# Patient Record
Sex: FEMALE | Race: WHITE | NOT HISPANIC OR LATINO | Employment: UNEMPLOYED | URBAN - METROPOLITAN AREA
[De-identification: names, ages, dates, MRNs, and addresses within clinical notes are randomized per-mention and may not be internally consistent; named-entity substitution may affect disease eponyms.]

---

## 2023-05-01 ENCOUNTER — OFFICE VISIT (OUTPATIENT)
Dept: PRIMARY CARE | Facility: CLINIC | Age: 43
End: 2023-05-01

## 2023-05-01 VITALS — SYSTOLIC BLOOD PRESSURE: 119 MMHG | DIASTOLIC BLOOD PRESSURE: 82 MMHG

## 2023-05-01 DIAGNOSIS — E78.5 HYPERLIPIDEMIA, UNSPECIFIED HYPERLIPIDEMIA TYPE: ICD-10-CM

## 2023-05-01 DIAGNOSIS — Z12.11 SCREENING FOR COLON CANCER: ICD-10-CM

## 2023-05-01 DIAGNOSIS — I10 HYPERTENSION, UNSPECIFIED TYPE: ICD-10-CM

## 2023-05-01 DIAGNOSIS — A15.9 TB (TUBERCULOSIS): ICD-10-CM

## 2023-05-01 DIAGNOSIS — N60.02 BENIGN CYST OF LEFT BREAST IN FEMALE: Primary | ICD-10-CM

## 2023-05-01 DIAGNOSIS — J03.90 TONSILLITIS: ICD-10-CM

## 2023-05-01 PROCEDURE — 80053 COMPREHEN METABOLIC PANEL: CPT | Performed by: INTERNAL MEDICINE

## 2023-05-01 PROCEDURE — 1036F TOBACCO NON-USER: CPT | Performed by: INTERNAL MEDICINE

## 2023-05-01 PROCEDURE — 3079F DIAST BP 80-89 MM HG: CPT | Performed by: INTERNAL MEDICINE

## 2023-05-01 PROCEDURE — 36415 COLL VENOUS BLD VENIPUNCTURE: CPT | Performed by: INTERNAL MEDICINE

## 2023-05-01 PROCEDURE — 86481 TB AG RESPONSE T-CELL SUSP: CPT

## 2023-05-01 PROCEDURE — 85025 COMPLETE CBC W/AUTO DIFF WBC: CPT | Performed by: INTERNAL MEDICINE

## 2023-05-01 PROCEDURE — 3074F SYST BP LT 130 MM HG: CPT | Performed by: INTERNAL MEDICINE

## 2023-05-01 PROCEDURE — 99214 OFFICE O/P EST MOD 30 MIN: CPT | Performed by: INTERNAL MEDICINE

## 2023-05-01 PROCEDURE — 80061 LIPID PANEL: CPT | Performed by: INTERNAL MEDICINE

## 2023-05-01 RX ORDER — DOXYCYCLINE 100 MG/1
100 TABLET ORAL EVERY 12 HOURS
Qty: 20 TABLET | Refills: 0 | Status: SHIPPED | OUTPATIENT
Start: 2023-05-01 | End: 2024-02-26 | Stop reason: ALTCHOICE

## 2023-05-01 RX ORDER — DOXYCYCLINE 100 MG/1
100 TABLET ORAL EVERY 12 HOURS
COMMUNITY
Start: 2022-05-22 | End: 2023-05-01 | Stop reason: SDUPTHER

## 2023-05-01 NOTE — PROGRESS NOTES
Subjective   Patient ID: Maureen Thapa is a 43 y.o. female who presents for PPD Read.    HPI   43 years old comes to see me because she needed a TB test on the blood.  She cannot do skin test because she had BCG in the past and it always shows positive.  Also complaining of exposure to viruses from her children 14 and 11 and 8 years old.  Always having a sore throat and concerned about tonsillitis.  Never heard of mononucleosis.  We agreed to give her not to biotic other than penicillin.  Gargle and use a lot of Chloraseptic, vitamin C vitamin D and zinc oxide.  She thinks she is doing in any event.  Review of Systems  12 system reviewed and all negative.  Objective   /82 (BP Location: Right arm, Patient Position: Sitting)     Physical Exam  Alert oriented in no acute distress.  Nonicteric sclera no jaundice.  Face symmetrical cranial nerves intact.  Throat clean small tonsils no spots no infection or exudate present.  No lymph nodes palpable at the throat or neck area.  No cervical lymph nodes.  Neck supple no masses no lymph no thyromegaly or jugular venous distention.  Lungs clear no rales no wheezing or crackles.  Heart normal S1 and S2 regular rhythm.  Abdomen benign nontender no masses no organomegaly.  No pain on palpations.  Neurological exam negative.  She is already scheduled for repeat left breast ultrasound in August this year.  She would like to have a colonoscopy instead of Cologuard.  Testing for TB on the blood was done including the other blood test which we will call her with results as soon as available.  Agreed to use doxycycline twice a day for 7 to 10 days.  Assessment/Plan   Diagnoses and all orders for this visit:  Benign cyst of left breast in female  Screening for colon cancer  -     Colonoscopy; Future  Hypertension, unspecified type  -     Comprehensive Metabolic Panel  -     CBC  Hyperlipidemia, unspecified hyperlipidemia type  -     Lipid Panel  TB (tuberculosis)  -     T-Spot  TB

## 2023-05-04 LAB
NIL(NEG) CONTROL SPOT COUNT: NORMAL
PANEL A SPOT COUNT: 0
PANEL B SPOT COUNT: 0
POS CONTROL SPOT COUNT: NORMAL
T-SPOT. TB INTERPRETATION: NEGATIVE

## 2023-05-05 ENCOUNTER — TELEPHONE (OUTPATIENT)
Dept: PRIMARY CARE | Facility: CLINIC | Age: 43
End: 2023-05-05

## 2023-05-05 NOTE — TELEPHONE ENCOUNTER
Planning to call patient with lab results.  She walked in the office and we were able to discuss her lab results on face-to-face.  TB test negative.  Rest of her labs within normal limit.

## 2023-09-26 ENCOUNTER — TELEPHONE (OUTPATIENT)
Dept: PRIMARY CARE | Facility: CLINIC | Age: 43
End: 2023-09-26

## 2023-10-30 ENCOUNTER — TELEMEDICINE (OUTPATIENT)
Dept: PRIMARY CARE | Facility: CLINIC | Age: 43
End: 2023-10-30

## 2023-10-30 DIAGNOSIS — J20.9 ACUTE BRONCHITIS, UNSPECIFIED ORGANISM: Primary | ICD-10-CM

## 2023-10-30 PROCEDURE — 99441 PR PHYS/QHP TELEPHONE EVALUATION 5-10 MIN: CPT | Performed by: INTERNAL MEDICINE

## 2023-10-30 RX ORDER — AZITHROMYCIN 250 MG/1
TABLET, FILM COATED ORAL
Qty: 6 TABLET | Refills: 0 | Status: SHIPPED | OUTPATIENT
Start: 2023-10-30 | End: 2023-11-04

## 2023-10-30 RX ORDER — BENZONATATE 200 MG/1
200 CAPSULE ORAL 3 TIMES DAILY PRN
Qty: 30 CAPSULE | Refills: 0 | Status: SHIPPED | OUTPATIENT
Start: 2023-10-30 | End: 2023-11-29

## 2023-10-30 NOTE — PROGRESS NOTES
Subjective   Patient ID: Maureen Thapa is a 43 y.o. female who presents for No chief complaint on file..    HPI   I had a telephone visit with Carmella obrien at around 6:00 on October 30, 2023.  She is complaining of severe cough for the last 4 weeks.  She was checked multiple times for COVID and was mostly negative.  She has no fever.  Severe sore throat and now it came down to her lungs where she is coughing up phlegm yellowish to greenish color.  No nasal discharge.  She is allergic to penicillin when she takes it in high doses.  We agreed to treat her for acute bronchitis with azithromycin and benzonatate for cough.  I will put  Review of Systems  12 system reviewed mostly negative except for severe cough and productive cough with a sore throat.  Objective   There were no vitals taken for this visit.  This visit was completed via telephone due to the restriction of the COVID 19 pandemic.  All issues were addressed and discussed but no physical exam was done or performed.  If it was felt that this patient should be evaluated in clinic then they were directed there.  The patient verbally consented to this visit.  I spent 5 minutes on the phone discussing health concerns with this patient  Physical Exam  No exam done.  Assessment/Plan   Diagnoses and all orders for this visit:  Acute bronchitis, unspecified organism

## 2024-02-15 ENCOUNTER — APPOINTMENT (OUTPATIENT)
Dept: RADIOLOGY | Facility: CLINIC | Age: 44
End: 2024-02-15
Payer: COMMERCIAL

## 2024-02-15 ENCOUNTER — HOSPITAL ENCOUNTER (OUTPATIENT)
Dept: RADIOLOGY | Facility: CLINIC | Age: 44
Discharge: HOME | End: 2024-02-15
Payer: COMMERCIAL

## 2024-02-15 VITALS — HEIGHT: 67 IN | WEIGHT: 155 LBS | BODY MASS INDEX: 24.33 KG/M2

## 2024-02-15 DIAGNOSIS — R92.8 OTHER ABNORMAL AND INCONCLUSIVE FINDINGS ON DIAGNOSTIC IMAGING OF BREAST: ICD-10-CM

## 2024-02-15 DIAGNOSIS — Z12.31 SCREENING MAMMOGRAM FOR BREAST CANCER: ICD-10-CM

## 2024-02-15 PROCEDURE — 77066 DX MAMMO INCL CAD BI: CPT | Performed by: RADIOLOGY

## 2024-02-15 PROCEDURE — 77062 BREAST TOMOSYNTHESIS BI: CPT | Performed by: RADIOLOGY

## 2024-02-15 PROCEDURE — 77066 DX MAMMO INCL CAD BI: CPT

## 2024-02-24 NOTE — PROGRESS NOTES
Subjective   Patient ID: Maureen Thapa is a 43 y.o. female who presents for Annual Exam.    PAP 4-19-21 ASCUS, HPV NEG  MAMMO 2-15-24  DEXA NEVER  COLON 5-1-23 good for 10 years.      Mother passed of Covid, was diagnosed with a small bowel condition that is genetic, spine bends and puts weight on bowel. If she keeps a good diet and doesn't gain weight it shouldn't be an issue.     Paragard inserted 3/15. Cycles becoming more irregular 27 days. Had a pregnancy a few years that she passed with IUD.    Not working. Kids in school. 14, 12, 9. Taking MVI with D and EPA. Daughter diagnosed with autoimmune disease, genetic. It is CAH.     H/O overactive thyroid, ok couple years ago. Was seeing Dermatologist for acne. Male hormones elevated. Stopped spironolactone.      Recommend vit D. in MVI. Valtrex for cold sore.     No longer stomach issues.         Review of Systems   All other systems reviewed and are negative.      Objective   Constitutional: Alert and in no acute distress. Well developed, well nourished.  Neck: no neck asymmetry. Supple and thyroid not enlarged and there were no palpable thyroid nodules.  Chest: Breasts normal appearance, no nipple discharge and no skin changes and palpation of breasts and axillae: no palpable mass and no axillary lymphadenopathy.  Abdomen: soft nontender; no abdominal mass palpated, no organomegaly and no hernias.  Genitourinary: external genitalia: normal, no inguinal lymphadenopathy, Bartholin's urethral and [place]'s glands: normal, urethra: normal and bladder: normal on palpation.  Vagina: normal.  Cervix: normal. Strings seen.  Uterus: normal.   Right adnexa/parametria: normal.  Left adnexa/parametria: normal.  Skin: normal skin color and pigmentation, normal skin turgor and no rash.  Psychiatric: alert and oriented x 3, affect normal to patient baseline and mood appropriate.     Assessment/Plan   -Patient will call January 2025 to get approval for new Paragard and schedule  annual and insertion.  -pap-HPV  -Fariha ordered for next year.

## 2024-02-26 ENCOUNTER — OFFICE VISIT (OUTPATIENT)
Dept: OBSTETRICS AND GYNECOLOGY | Facility: CLINIC | Age: 44
End: 2024-02-26
Payer: COMMERCIAL

## 2024-02-26 VITALS
DIASTOLIC BLOOD PRESSURE: 62 MMHG | HEIGHT: 67 IN | BODY MASS INDEX: 25.11 KG/M2 | WEIGHT: 160 LBS | SYSTOLIC BLOOD PRESSURE: 108 MMHG

## 2024-02-26 DIAGNOSIS — Z01.419 WELL WOMAN EXAM WITH ROUTINE GYNECOLOGICAL EXAM: ICD-10-CM

## 2024-02-26 DIAGNOSIS — Z12.31 ENCOUNTER FOR SCREENING MAMMOGRAM FOR BREAST CANCER: Primary | ICD-10-CM

## 2024-02-26 DIAGNOSIS — Z12.4 CERVICAL CANCER SCREENING: ICD-10-CM

## 2024-02-26 PROCEDURE — 1036F TOBACCO NON-USER: CPT | Performed by: OBSTETRICS & GYNECOLOGY

## 2024-02-26 PROCEDURE — 88175 CYTOPATH C/V AUTO FLUID REDO: CPT

## 2024-02-26 PROCEDURE — 87624 HPV HI-RISK TYP POOLED RSLT: CPT

## 2024-02-26 PROCEDURE — 99396 PREV VISIT EST AGE 40-64: CPT | Performed by: OBSTETRICS & GYNECOLOGY

## 2024-02-26 RX ORDER — COPPER 313.4 MG/1
1 INTRAUTERINE DEVICE INTRAUTERINE ONCE
COMMUNITY

## 2024-02-26 RX ORDER — MULTIVITAMIN
TABLET ORAL
COMMUNITY

## 2024-02-26 RX ORDER — VALACYCLOVIR HYDROCHLORIDE 500 MG/1
500 TABLET, FILM COATED ORAL
COMMUNITY

## 2024-07-12 ENCOUNTER — TELEPHONE (OUTPATIENT)
Dept: PRIMARY CARE | Facility: CLINIC | Age: 44
End: 2024-07-12

## 2024-07-12 ENCOUNTER — OFFICE VISIT (OUTPATIENT)
Dept: PRIMARY CARE | Facility: CLINIC | Age: 44
End: 2024-07-12
Payer: COMMERCIAL

## 2024-07-12 VITALS
WEIGHT: 153 LBS | TEMPERATURE: 98.4 F | BODY MASS INDEX: 23.96 KG/M2 | OXYGEN SATURATION: 97 % | HEART RATE: 93 BPM | SYSTOLIC BLOOD PRESSURE: 107 MMHG | DIASTOLIC BLOOD PRESSURE: 73 MMHG

## 2024-07-12 DIAGNOSIS — Z86.69 H/O TINNITUS: ICD-10-CM

## 2024-07-12 DIAGNOSIS — D64.9 ANEMIA, UNSPECIFIED TYPE: Primary | ICD-10-CM

## 2024-07-12 DIAGNOSIS — R53.83 FATIGUE, UNSPECIFIED TYPE: ICD-10-CM

## 2024-07-12 DIAGNOSIS — R31.21 ASYMPTOMATIC MICROSCOPIC HEMATURIA: ICD-10-CM

## 2024-07-12 DIAGNOSIS — J01.01 ACUTE RECURRENT MAXILLARY SINUSITIS: ICD-10-CM

## 2024-07-12 DIAGNOSIS — R05.1 ACUTE COUGH: ICD-10-CM

## 2024-07-12 DIAGNOSIS — I73.9 PVD (PERIPHERAL VASCULAR DISEASE) (CMS-HCC): ICD-10-CM

## 2024-07-12 DIAGNOSIS — I10 HYPERTENSION, UNSPECIFIED TYPE: ICD-10-CM

## 2024-07-12 DIAGNOSIS — E78.2 MIXED HYPERLIPIDEMIA: ICD-10-CM

## 2024-07-12 LAB
APPEARANCE UR: CLEAR
BILIRUB UR QL STRIP: NEGATIVE
COLOR UR: YELLOW
GLUCOSE UR STRIP-MCNC: NEGATIVE MG/DL
HGB UR QL STRIP: NEGATIVE
KETONES UR STRIP-MCNC: NEGATIVE MG/DL
LEUKOCYTE ESTERASE UR QL STRIP: NEGATIVE
NITRITE UR QL STRIP: NEGATIVE
PH UR STRIP: 6 [PH]
PROT UR STRIP-MCNC: NEGATIVE MG/DL
SP GR UR STRIP.AUTO: 1.01
UROBILINOGEN UR STRIP-ACNC: 0.2 E.U./DL

## 2024-07-12 RX ORDER — BENZONATATE 200 MG/1
200 CAPSULE ORAL 3 TIMES DAILY PRN
Qty: 30 CAPSULE | Refills: 0 | Status: SHIPPED | OUTPATIENT
Start: 2024-07-12 | End: 2024-08-11

## 2024-07-12 RX ORDER — AMOXICILLIN AND CLAVULANATE POTASSIUM 500; 125 MG/1; MG/1
500 TABLET, FILM COATED ORAL 2 TIMES DAILY
Qty: 20 TABLET | Refills: 0 | Status: SHIPPED | OUTPATIENT
Start: 2024-07-12 | End: 2024-07-22

## 2024-07-12 ASSESSMENT — PATIENT HEALTH QUESTIONNAIRE - PHQ9
1. LITTLE INTEREST OR PLEASURE IN DOING THINGS: NOT AT ALL
SUM OF ALL RESPONSES TO PHQ9 QUESTIONS 1 AND 2: 0
2. FEELING DOWN, DEPRESSED OR HOPELESS: NOT AT ALL

## 2024-07-12 ASSESSMENT — ENCOUNTER SYMPTOMS
OCCASIONAL FEELINGS OF UNSTEADINESS: 0
LOSS OF SENSATION IN FEET: 0
DEPRESSION: 0

## 2024-07-12 NOTE — PROGRESS NOTES
Subjective   Patient ID: Maureen Thapa is a 44 y.o. female who presents for Earache (Right) and Sinusitis.    HPI   44 years old female comes in to see me today because she was exposed to her son 9 years old who had a virus infection.  A week ago she was having a sore throat with a cough productive with right ear pain feels like muffled headaches and the sinus pain.  Review of Systems  12 system review 12 system negative.  Objective   /73 (BP Location: Right arm, Patient Position: Sitting, BP Cuff Size: Adult)   Pulse 93   Temp 36.9 °C (98.4 °F) (Temporal)   Wt 69.4 kg (153 lb)   SpO2 97%   BMI 23.96 kg/m²     Physical Exam  At oriented in no distress.  Ears are clean on both sides no wax and no signs of infection or inflammation.  Nose reveals right side sinus inflammation with the drainage positive.  Throat clear no exudate or inflammation but slight redness.  Neck supple no masses no lymph node no thyromegaly no jugular venous distention.  Lungs clear no rales wheezing or crackles.  Heart normal S1 and S2 regular rhythm.  Abdomen benign nontender no masses no organomegaly.  Neurologically intact.  Lower extremities pain.  We agreed to treat her sinus infection with Augmentin even though she is allergic to penicillin she states that she takes Augmentin and amoxicillin as long as she does not take it more than 7 to 10 days she is fine I insisted to change her medication but she claims that the amoxicillin works best.  And she would really like to have it.  Adding benzonatate for her cough.  Referral to see vascular medicine surgeon Dr. Andie Pinzon.  Assessment/Plan   Diagnoses and all orders for this visit:  Anemia, unspecified type  -     CBC  Hypertension, unspecified type  -     Comprehensive Metabolic Panel  Mixed hyperlipidemia  -     Lipid Panel  Fatigue, unspecified type  -     Thyroid Stimulating Hormone  Asymptomatic microscopic hematuria  -     POCT UA (Automated) docked device  H/O  tinnitus  PVD (peripheral vascular disease) (CMS-[facility])  -     Referral to Vascular Medicine; Future  Acute recurrent maxillary sinusitis  -     amoxicillin-pot clavulanate (Augmentin) 500-125 mg tablet; Take 1 tablet (500 mg) by mouth 2 times a day for 10 days.  Acute cough  -     benzonatate (Tessalon) 200 mg capsule; Take 1 capsule (200 mg) by mouth 3 times a day as needed for cough. Do not crush or chew.  Other orders  -     POCT URINALYSIS AUTOMATED

## 2024-07-16 ENCOUNTER — TELEPHONE (OUTPATIENT)
Dept: PRIMARY CARE | Facility: CLINIC | Age: 44
End: 2024-07-16
Payer: COMMERCIAL

## 2024-07-30 ENCOUNTER — OFFICE VISIT (OUTPATIENT)
Dept: PRIMARY CARE | Facility: CLINIC | Age: 44
End: 2024-07-30
Payer: COMMERCIAL

## 2024-07-30 ENCOUNTER — APPOINTMENT (OUTPATIENT)
Dept: PRIMARY CARE | Facility: CLINIC | Age: 44
End: 2024-07-30
Payer: COMMERCIAL

## 2024-07-30 VITALS
OXYGEN SATURATION: 98 % | HEART RATE: 80 BPM | BODY MASS INDEX: 24.12 KG/M2 | SYSTOLIC BLOOD PRESSURE: 115 MMHG | DIASTOLIC BLOOD PRESSURE: 78 MMHG | TEMPERATURE: 98.2 F | WEIGHT: 154 LBS

## 2024-07-30 DIAGNOSIS — J32.3 SINUSITIS CHRONIC, SPHENOIDAL: ICD-10-CM

## 2024-07-30 DIAGNOSIS — R42 VERTIGO: ICD-10-CM

## 2024-07-30 DIAGNOSIS — N30.00 ACUTE CYSTITIS WITHOUT HEMATURIA: Primary | ICD-10-CM

## 2024-07-30 LAB
APPEARANCE UR: CLEAR
BILIRUB UR QL STRIP: NEGATIVE
COLOR UR: YELLOW
GLUCOSE UR STRIP-MCNC: NEGATIVE MG/DL
HGB UR QL STRIP: NEGATIVE
KETONES UR STRIP-MCNC: NEGATIVE MG/DL
LEUKOCYTE ESTERASE UR QL STRIP: ABNORMAL
NITRITE UR QL STRIP: POSITIVE
PH UR STRIP: 7 [PH]
PROT UR STRIP-MCNC: NEGATIVE MG/DL
SP GR UR STRIP.AUTO: 1.02
UROBILINOGEN UR STRIP-ACNC: 0.2 E.U./DL

## 2024-07-30 PROCEDURE — 87086 URINE CULTURE/COLONY COUNT: CPT

## 2024-07-30 PROCEDURE — 81003 URINALYSIS AUTO W/O SCOPE: CPT | Performed by: INTERNAL MEDICINE

## 2024-07-30 PROCEDURE — 1036F TOBACCO NON-USER: CPT | Performed by: INTERNAL MEDICINE

## 2024-07-30 PROCEDURE — 99214 OFFICE O/P EST MOD 30 MIN: CPT | Performed by: INTERNAL MEDICINE

## 2024-07-30 PROCEDURE — 87186 SC STD MICRODIL/AGAR DIL: CPT

## 2024-07-30 RX ORDER — MECLIZINE HYDROCHLORIDE 25 MG/1
25 TABLET ORAL 3 TIMES DAILY PRN
Qty: 30 TABLET | Refills: 2 | Status: SHIPPED | OUTPATIENT
Start: 2024-07-30 | End: 2025-07-30

## 2024-07-30 RX ORDER — SULFAMETHOXAZOLE AND TRIMETHOPRIM 800; 160 MG/1; MG/1
1 TABLET ORAL 2 TIMES DAILY
Qty: 20 TABLET | Refills: 0 | Status: SHIPPED | OUTPATIENT
Start: 2024-07-30 | End: 2024-08-02

## 2024-07-30 ASSESSMENT — PATIENT HEALTH QUESTIONNAIRE - PHQ9
2. FEELING DOWN, DEPRESSED OR HOPELESS: NOT AT ALL
SUM OF ALL RESPONSES TO PHQ9 QUESTIONS 1 AND 2: 0
1. LITTLE INTEREST OR PLEASURE IN DOING THINGS: NOT AT ALL

## 2024-07-30 ASSESSMENT — ENCOUNTER SYMPTOMS
DEPRESSION: 0
OCCASIONAL FEELINGS OF UNSTEADINESS: 0
LOSS OF SENSATION IN FEET: 0

## 2024-07-30 NOTE — PROGRESS NOTES
Subjective   Patient ID: Maureen Thapa is a 44 y.o. female who presents for Vertigo (Right eat popping, vomitting).    HPI   44 years old female comes in to see me today complaining of vertigo when she moves her head she feels like the whole room is spinning around her.  She had a fall tonight when she stood up to go to the bathroom.  Felt very dizzy.  She is concerned about driving the kids around.  She had a also UTI on top of all with frequent urination but no hematuria dysuria is present.  She is allergic to penicillin.  She had sinus infection not too long ago that affected her right ear.  Her right ear feels like a popping.  She is asking for an ENT consult which I agreed to.  Review of Systems  12 system reviewed 12 system negative except for right ear popping, UTI and vertebral.  Objective   /78 (BP Location: Right arm, Patient Position: Sitting, BP Cuff Size: Adult)   Pulse 80   Temp 36.8 °C (98.2 °F) (Temporal)   Wt 69.9 kg (154 lb)   SpO2 98%   BMI 24.12 kg/m²     Physical Exam  Alert oriented no distress active and able to ambulate and walk without any problems.  Moving her head right up and down right left causes vertigo and dizzy spells.  Right ear looks good and normal.  Throat is good no signs of infection.  No drainage from her nose.  Neck supple no masses no lymph node no thyromegaly or jugular venous distention.  Nonicteric sclera.  No jaundice.  Lungs clear.  Heart normal S1 and S2 regular rhythm.  Abdomen benign neurologically intact.  Treated vertigo with meclizine 25 mg 3 times a day.  Treat UTI with Bactrim DS for 5 to 7 days twice a day.  Refer to ENT for sinus and ear pain or block  Urinalysis and urine culture obtained.  Assessment/Plan   Diagnoses and all orders for this visit:  Acute cystitis without hematuria  -     sulfamethoxazole-trimethoprim (Bactrim DS) 800-160 mg tablet; Take 1 tablet by mouth 2 times a day for 10 days.  -     POCT UA (Automated) docked device  -      Urine Culture; Future  Vertigo  -     meclizine (Antivert) 25 mg tablet; Take 1 tablet (25 mg) by mouth 3 times a day as needed for dizziness.  Sinusitis chronic, sphenoidal  -     Referral to ENT; Future

## 2024-08-01 LAB — BACTERIA UR CULT: ABNORMAL

## 2024-08-02 ENCOUNTER — TELEPHONE (OUTPATIENT)
Dept: PRIMARY CARE | Facility: CLINIC | Age: 44
End: 2024-08-02
Payer: COMMERCIAL

## 2024-08-02 DIAGNOSIS — R31.9 URINARY TRACT INFECTION WITH HEMATURIA, SITE UNSPECIFIED: ICD-10-CM

## 2024-08-02 DIAGNOSIS — N30.00 ACUTE CYSTITIS WITHOUT HEMATURIA: Primary | ICD-10-CM

## 2024-08-02 DIAGNOSIS — N39.0 URINARY TRACT INFECTION WITH HEMATURIA, SITE UNSPECIFIED: ICD-10-CM

## 2024-08-02 RX ORDER — NITROFURANTOIN 25; 75 MG/1; MG/1
100 CAPSULE ORAL 2 TIMES DAILY
Qty: 20 CAPSULE | Refills: 0 | Status: SHIPPED | OUTPATIENT
Start: 2024-08-02 | End: 2024-08-12

## 2024-08-02 RX ORDER — NITROFURANTOIN 25; 75 MG/1; MG/1
100 CAPSULE ORAL 2 TIMES DAILY
Qty: 20 CAPSULE | Refills: 1 | Status: SHIPPED | OUTPATIENT
Start: 2024-08-02 | End: 2024-10-01

## 2024-09-05 ENCOUNTER — APPOINTMENT (OUTPATIENT)
Dept: OTOLARYNGOLOGY | Facility: CLINIC | Age: 44
End: 2024-09-05
Payer: COMMERCIAL

## 2024-09-05 VITALS — WEIGHT: 155 LBS | BODY MASS INDEX: 24.28 KG/M2

## 2024-09-05 DIAGNOSIS — J30.9 ALLERGIC RHINITIS, UNSPECIFIED SEASONALITY, UNSPECIFIED TRIGGER: ICD-10-CM

## 2024-09-05 DIAGNOSIS — H93.19 TINNITUS, UNSPECIFIED LATERALITY: ICD-10-CM

## 2024-09-05 DIAGNOSIS — H69.93 DYSFUNCTION OF BOTH EUSTACHIAN TUBES: ICD-10-CM

## 2024-09-05 DIAGNOSIS — J32.9 CHRONIC SINUSITIS, UNSPECIFIED LOCATION: Primary | ICD-10-CM

## 2024-09-05 DIAGNOSIS — J35.01 CHRONIC TONSILLITIS: ICD-10-CM

## 2024-09-05 PROCEDURE — 99204 OFFICE O/P NEW MOD 45 MIN: CPT | Performed by: GENERAL PRACTICE

## 2024-09-05 RX ORDER — METHYLPREDNISOLONE 4 MG/1
TABLET ORAL
Qty: 21 TABLET | Refills: 0 | Status: SHIPPED | OUTPATIENT
Start: 2024-09-05

## 2024-09-05 RX ORDER — CLARITHROMYCIN 500 MG/1
1000 TABLET, FILM COATED, EXTENDED RELEASE ORAL DAILY
Qty: 20 TABLET | Refills: 0 | Status: SHIPPED | OUTPATIENT
Start: 2024-09-05 | End: 2024-09-15

## 2024-09-05 RX ORDER — TRIAMCINOLONE ACETONIDE 55 UG/1
2 SPRAY, METERED NASAL DAILY
Qty: 16.5 G | Refills: 3 | Status: SHIPPED | OUTPATIENT
Start: 2024-09-05 | End: 2025-09-05

## 2024-09-06 NOTE — PROGRESS NOTES
Otolaryngology - Head and Neck Surgery Outpatient New Patient Visit Note        Assessment/Plan:   Problem List Items Addressed This Visit    None  Visit Diagnoses         Codes    Chronic sinusitis, unspecified location    -  Primary J32.9    Relevant Medications    clarithromycin XL (Biaxin XL) 500 mg 24 hr tablet    Other Relevant Orders    CT sinus wo IV contrast    Dysfunction of both eustachian tubes     H69.93    Relevant Medications    methylPREDNISolone (Medrol Dospak) 4 mg tablets    triamcinolone (Nasacort) 55 mcg nasal inhaler    Other Relevant Orders    CT sinus wo IV contrast    Tinnitus, unspecified laterality     H93.19    Relevant Orders    Referral to Audiology    Chronic tonsillitis     J35.01    Allergic rhinitis, unspecified seasonality, unspecified trigger     J30.9            44yoF with a history of chronic sinus pressure/discomfort, recurrent URIs and ETD symptoms.      Will acquire treated sinus CT to aid in eval   Nasacort for baseline rhinitis due to to dry irritation with flonase.        Pt with bothersome tonsil stones and some chronic irritation.  Considering tonsillectomy.  Discussed controls for irritants prior to considering tonsillectomy.      Audiogram for baseline tinnitus and recent ETD symptoms.         Follow up:  -plan for follow up in clinic as needed, after completion of ordered studies, and in 1-2 months    All of the above findings, impressions, treatment planning and follow up plans were discussed with the patient who indicated understanding.  the patient was instructed to contact or return to clinic sooner if symptoms/signs persist or worsen despite the above management.      Chong Quach MD  Otolaryngology - Head and Neck Surgery            History Of Present Illness  Maureen Thapa is a 44 y.o. female presenting for evaluation fo a history of recurrent sinusitis and chronic rhinitis.    Reports chronic congestion, some waxing/waning max/midface pain/pressure and  bothersome PND.     Notes recurrent sinusitis 2-3x yearly, requiring abx or OTC care.     Reports allergic rhinitis, uses allegra PRN. Prior use of flonase over months which caused increase in symptoms and irritation.   Saline sprays intermittently.     Reports R>L sinus pressure/discomfort.     Notes some chronic sore throat associated with PND, and tonsil stones.     Rare GERD history.     Reports waxing/waning R>L ear fullness and pressure in recent months, worse with URIs.      Reports some dizziness/vertigo associated with recent URI which resolved over several days, now asymptomatic.    Rare prior vertigo/dizziness.     Noes some baseline tinnitus b/l.                  Past Medical History  She has a past medical history of Personal history of other complications of pregnancy, childbirth and the puerperium, Personal history of other diseases of the circulatory system, Personal history of other diseases of the circulatory system, Personal history of other diseases of the female genital tract, and Varicella without complication.    Surgical History  She has a past surgical history that includes Varicose vein surgery (06/10/2016) and Mouth surgery (12/18/2014).     Social History  She reports that she quit smoking about 44 years ago. Her smoking use included cigarettes. She has never used smokeless tobacco. She reports that she does not currently use alcohol after a past usage of about 1.0 standard drink of alcohol per week. She reports that she does not use drugs.    Family History  Family History   Problem Relation Name Age of Onset    Blood clot Mother      Diabetes Father      Other (HTN) Father      Hyperlipidemia Father      Cancer Mother's Sister          Allergies  Iodinated contrast media and Penicillin g    Review of Systems  ROS: Pertinent positives as noted in HPI.    - CONSTITUTIONAL: Does not report weight loss, fever or chills.    - HEENT:   Ear: Does not report  , vertigo,    , otalgia,  otorrhea  Nose: Does not report  ,  , epistaxis, decreased smell  Throat: Does not report pain, dysphagia, odynophagia  Larynx: Does not report hoarseness,  difficulty breathing, pain with speaking (odynophonia)  Neck: Does not report new masses, pain, swelling  Face: Does not report    ,  , swelling, numbness, weakness     - RESPIRATORY: Does not report SOB or cough.    - CV: Does not report palpitations or chest pain.     - GI: Does not report abdominal pain, nausea, vomiting or diarrhea.    - : Does not report dysuria or urinary frequency.    - MSK: Does not report myalgia or joint pain.    - SKIN: Does not report rash or pruritus.    - NEUROLOGICAL: Does not report headache or syncope.    - PSYCHIATRIC: Does not report recent changes in mood. Does not report anxiety or depression.         Physical Exam:     GENERAL:   Alert & Oriented to person, place and time; Normal affect and appearance. Well developed and well nourished. Conversant & cooperative with examination.     HEAD:   Normocephalic, atraumatic. No sinus tenderness to palpation. Normal parotid bilaterally. Normal facial strength.     NEUROLOGIC:   Cranial nerves II-XII grossly intact, gait WNL. Normal mood and affect.    EYES:   Extraocular movements intact. Pupils equal, round, reactive to light and accommodation. No nystagmus, no ptosis. no scleral injection.    EAR:   Normal auricle. No discomfort or TTP with manipulation.   Handheld otoscopic exam showed normal external auditory canals bilaterally. No purulence or EAC inflammation. Minimal cerumen.   Right tympanic membrane clear and mobile without evidence of perforation, retraction or middle ear effusion.   Left tympanic membrane clear and mobile without evidence of perforation, retraction or middle ear effusion.     NOSE:   No external deformity. No external nasal lesions, lacerations, or scars. Nasal tip symmetrical with normal nasal valves.   Nasal cavity with essentially midline septum,  edematous  mucosa and turbinates. No lesions, masses, purulence or polyps.     OC/OP:   Mucous membranes moist, no masses, lesions or exudates.   Normal tongue, floor of mouth, teeth, gums, lips. Normal posterior pharyngeal wall.    Cryptic 1+  tonsils without erythema, exudate or obvious calculi     NECK:   No neck masses or thyroid enlargement. Trachea midline. No tenderness to palpation    LYMPHATIC:   No cervical lymphadenopathy.     RESPIRATORY:   Symmetric chest elevation & no retractions. No significant hoarseness. No increased work of breathing.    CV:   No clubbing or cyanosis. No obvious edema    Skin:   No facial rashes, vesicles or lesions.     Extremities:   No gross abnormalities      Clinic Procedure        Information review:  External sources (notes, imaging, lab results) listed below personally reviewed to aid in medical decision making process.  -  -  -

## 2024-09-20 ENCOUNTER — HOSPITAL ENCOUNTER (OUTPATIENT)
Dept: RADIOLOGY | Facility: HOSPITAL | Age: 44
Discharge: HOME | End: 2024-09-20
Payer: COMMERCIAL

## 2024-09-20 DIAGNOSIS — J32.9 CHRONIC SINUSITIS, UNSPECIFIED LOCATION: ICD-10-CM

## 2024-09-20 DIAGNOSIS — H69.93 DYSFUNCTION OF BOTH EUSTACHIAN TUBES: ICD-10-CM

## 2024-09-20 PROCEDURE — 70486 CT MAXILLOFACIAL W/O DYE: CPT

## 2024-09-23 ENCOUNTER — OFFICE VISIT (OUTPATIENT)
Dept: CARDIOLOGY | Facility: CLINIC | Age: 44
End: 2024-09-23
Payer: COMMERCIAL

## 2024-09-23 VITALS
BODY MASS INDEX: 24.33 KG/M2 | HEART RATE: 85 BPM | DIASTOLIC BLOOD PRESSURE: 56 MMHG | SYSTOLIC BLOOD PRESSURE: 89 MMHG | WEIGHT: 155 LBS | HEIGHT: 67 IN | OXYGEN SATURATION: 98 %

## 2024-09-23 DIAGNOSIS — I83.813 VARICOSE VEINS OF BILATERAL LOWER EXTREMITIES WITH PAIN: ICD-10-CM

## 2024-09-23 DIAGNOSIS — I83.811 VARICOSE VEINS OF RIGHT LOWER EXTREMITY WITH PAIN: Primary | ICD-10-CM

## 2024-09-23 PROCEDURE — 1036F TOBACCO NON-USER: CPT | Performed by: INTERNAL MEDICINE

## 2024-09-23 PROCEDURE — 99203 OFFICE O/P NEW LOW 30 MIN: CPT | Performed by: INTERNAL MEDICINE

## 2024-09-23 PROCEDURE — 99213 OFFICE O/P EST LOW 20 MIN: CPT | Performed by: INTERNAL MEDICINE

## 2024-09-23 PROCEDURE — 3008F BODY MASS INDEX DOCD: CPT | Performed by: INTERNAL MEDICINE

## 2024-09-23 ASSESSMENT — ENCOUNTER SYMPTOMS
LOSS OF SENSATION IN FEET: 0
DEPRESSION: 0
OCCASIONAL FEELINGS OF UNSTEADINESS: 0

## 2024-09-23 ASSESSMENT — PAIN SCALES - GENERAL: PAINLEVEL: 0-NO PAIN

## 2024-09-23 NOTE — PROGRESS NOTES
Referred by Marco Ramírez for symptomatic varicose veins    History of Present Illness:  Maureen Thapa is a/an 44 y.o. with symptomatic varicose veins status post ELVT of the left great saphenous vein at the [facility] in 2016. Now with symptoms on the left--aching, heaviness, itching, burning, tingling. All symptoms worse toward the end of the day, better first thing in the morning.    No history of thrombosis or bleeding varicose veins.    Family history of varicose veins in both parents.    Wears compression socks she's obtained over the counter.    Past Medical History:   Diagnosis Date    Personal history of other complications of pregnancy, childbirth and the puerperium     History of spontaneous     Personal history of other diseases of the circulatory system     History of vasculitis    Personal history of other diseases of the circulatory system     History of mitral valve prolapse    Personal history of other diseases of the female genital tract     Vaginal delivery    Varicella without complication     Varicella without complication     Past Surgical History:   Procedure Laterality Date    MOUTH SURGERY  2014    Oral Surgery Tooth Extraction    VARICOSE VEIN SURGERY  06/10/2016    Varicose Vein Ligation     Social History     Tobacco Use    Smoking status: Former     Current packs/day: 0.00     Types: Cigarettes     Quit date:      Years since quittin.7    Smokeless tobacco: Never   Vaping Use    Vaping status: Never Used   Substance Use Topics    Alcohol use: Not Currently     Alcohol/week: 1.0 standard drink of alcohol     Types: 1 Cans of beer per week    Drug use: Never     Family History   Problem Relation Name Age of Onset    Blood clot Mother      Diabetes Father      Other (HTN) Father      Hyperlipidemia Father      Cancer Mother's Sister       Current Outpatient Medications   Medication Sig Dispense Refill    copper (ParaGard T 380A) 380 square mm IUD 1 each by  "intrauterine route 1 time.      multivit with min-folic acid 0.4 mg tablet Take by mouth.      triamcinolone (Nasacort) 55 mcg nasal inhaler Administer 2 sprays into each nostril once daily. 16.5 g 3    valACYclovir (Valtrex) 500 mg tablet Take 1 tablet (500 mg) by mouth.       No current facility-administered medications for this visit.       Physical Examination:  Height 1.702 m (5' 7\"), weight 70.3 kg (155 lb), not currently breastfeeding.  No distress  No swelling  Bilateral spider and reticular veins  No chronic skin changes   No weeping, ulceration, or drainage      Pertinent Labs:    Pertinent Imaging:  Venous insufficiency ultrasound 2/25/2016  IMPRESSION   ----------     RIGHT SIDE - DEEP VEINS   Negative for acute deep vein thrombosis.   Negative for valvular incompetency in the common femoral vein, femoral vein and   popliteal vein.     RIGHT SIDE - SUPERFICIAL VEINS Negative for valvular incompetency in the great   saphenous vein. Competent junction and great saphenous vein throughout (competent   perforators and anterior lateral saphenous).   Negative for valvular incompetency in the small saphenous vein. Junction not seen,    competent small saphenous vein throughout.   Negative for superficial thrombophlebitis in the great saphenous vein and small   saphenous vein.     LEFT SIDE - DEEP VEINS   Negative for acute deep vein thrombosis.   Negative for valvular incompetency in the common femoral vein, femoral vein and   popliteal vein.     LEFT SIDE - SUPERFICIAL VEINS Positive for valvular incompetency in the great   saphenous vein. Incompetent junction and great saphenous vein from groin to distal    thigh where an incompetent branch varicosity is seen coursing posteriorly and   distally. (competent perforators and anterior lateral saphenous).   Negative for valvular incompetency in the small saphenous vein. Competent junction    and small saphenous vein throughout.   Negative for superficial " thrombophlebitis in the great saphenous vein and small   saphenous vein.     Diagnoses and all orders for this visit:  Varicose veins of right lower extremity with pain (Primary)  -     Referral to Vascular Medicine  Varicose veins of bilateral lower extremities with pain  -     Referral to Vascular Surgery; Future  -     Compression Stockings 20-30 mmHg      Mavis Mejia MD, MS

## 2024-09-23 NOTE — PATIENT INSTRUCTIONS
You can get compression socks at [employer] or any other store that sells durable medical goods.     Once you have decided where you are going to go for socks, call in advance and find out when the person who measures you for socks will be there. Plan to go fairly early in the day. Many people have swelling that is worse toward the end of the day. If you are measured at the end of the day, you may not get a good fit.    You should put on your compression socks first thing in the morning. Take them off before bed.    If you use lotion or moisturizer on your legs, do NOT put lotion or moisturizer on immediately before you put on your socks, as it will make them difficult to pull up. Use your lotion or moisturizer at night.    Compression socks can be hand-washed or washed in a mesh bag in the washing machine. Air dry them. Do NOT put them in the dryer.    I have made a referral to Jannet To CNP or Lucas Boston MD (you probably can get in to see Jannet sooner) in the [facility].    To schedule, you should call the [facility] scheduling line at [phone].

## 2024-09-27 ENCOUNTER — CLINICAL SUPPORT (OUTPATIENT)
Dept: AUDIOLOGY | Facility: CLINIC | Age: 44
End: 2024-09-27
Payer: COMMERCIAL

## 2024-09-27 DIAGNOSIS — H93.11 TINNITUS AURIUM, RIGHT: ICD-10-CM

## 2024-09-27 DIAGNOSIS — H93.19 TINNITUS, UNSPECIFIED LATERALITY: ICD-10-CM

## 2024-09-27 DIAGNOSIS — Z01.10 NORMAL HEARING EXAM: Primary | ICD-10-CM

## 2024-09-27 PROCEDURE — 92557 COMPREHENSIVE HEARING TEST: CPT

## 2024-09-27 PROCEDURE — 92550 TYMPANOMETRY & REFLEX THRESH: CPT

## 2024-09-27 NOTE — PROGRESS NOTES
ADULT AUDIOLOGY EVALUATION    Name:  Maureen Thapa  :  1980  Age:  44 y.o.  Date of Evaluation:  2024     IMPRESSIONS     Today's test results indicate normal middle ear functioning bilaterally, with normal hearing sensitivity in both ears. Word recognition is excellent in both ears.  DPOAEs are present from 6021-2065 Hz in both ears, indicative of normal outer hair cell functioning at these frequencies.    RECOMMENDATIONS     Continue medical follow up with PCP and ENT. Patient is scheduled to follow up with Chong Quach MD on 10/16/24.  Return for annual hearing evaluation or sooner should new concerns arise.    Time:     HISTORY     Maureen Thapa is seen today for an audiologic evaluation. She is seen today at the referral of Chong Quach MD. Patient reports that in August she experienced a viral infection and experienced severe right sided tinnitus and vertigo for around three weeks. She reports that her symptoms eventually went away, after use of antibiotics, but she thinks she has always had some tinnitus in this ear that was exacerbated by her illness at the time. She does not have any concerns regarding her hearing and denies hearing changes with her recent virus. She noted that her father has hearing loss that she believes is age-related. Patient denied aural fullness, recent ear infections, otalgia, otorrhea, history of otologic surgeries or history of loud noise exposure.       TEST RESULTS     Otoscopic Evaluation:  Right Ear: Clear ear canal with unremarkable tympanic membrane  Left Ear: Clear ear canal with unremarkable tympanic membrane    Tympanometry:   Right Ear: Normal, type A tympanogram with normal ear canal volume, peak pressure and compliance.   Left Ear: Normal, type A tympanogram with normal ear canal volume, peak pressure and compliance.     Ipsilateral Acoustic Reflexes:   Right Ear: Present 500-4000 Hz.   Left Ear: Present 500-4000 Hz.     Pure Tone Audiometry  (125-8000 Hz):   Right Ear: Normal hearing sensitivity from 125-8000 Hz.  Left Ear: Normal hearing sensitivity from 125-8000 Hz.    Speech Audiometry:   Right Ear:    Speech Reception Threshold (SRT) was obtained at -5 dBHL using monitored live voice (MLV).   Word Recognition scores were excellent (100%) in quiet when words were presented at 50 dBHL using recorded NU-6 word list ordered by difficulty.  Left Ear:    Speech Reception Threshold (SRT) was obtained at 0 dBHL using monitored live voice (MLV).   Word Recognition scores were excellent (100%) in quiet when words were presented at 50 dBHL using recorded NU-6 word list ordered by difficulty.     Distortion Product Otoacoustic Emissions:  Right Ear: Present at 8923-8179 Hz, absent at 8000 Hz.  Left Ear: Present at 3004-1059 Hz, absent at 8000 Hz.          Lucas Aldridge, CCC-A  Clinical Audiologist    Degree of Hearing Sensitivity Decibel Range   Within Normal Limits (WNL) 0-25   Mild 26-40   Moderate 41-55   Moderately-Severe 56-70   Severe 71-90   Profound 91+      Key   CNT/DNT Could Not Test/Did Not Test   TM Tympanic Membrane   WNL Within Normal Limits   HA Hearing Aid   SNHL Sensorineural Hearing Loss   CHL Conductive Hearing Loss   NIHL Noise-Induced Hearing Loss   ECV Ear Canal Volume   MLV Monitored Live Voice     AUDIOGRAM

## 2024-10-16 ENCOUNTER — APPOINTMENT (OUTPATIENT)
Dept: OTOLARYNGOLOGY | Facility: CLINIC | Age: 44
End: 2024-10-16
Payer: COMMERCIAL

## 2024-10-16 VITALS — BODY MASS INDEX: 24.28 KG/M2 | WEIGHT: 155 LBS

## 2024-10-16 DIAGNOSIS — J35.01 CHRONIC TONSILLITIS: Primary | ICD-10-CM

## 2024-10-16 DIAGNOSIS — J34.2 DEVIATED SEPTUM: ICD-10-CM

## 2024-10-16 PROCEDURE — 99213 OFFICE O/P EST LOW 20 MIN: CPT | Performed by: GENERAL PRACTICE

## 2024-10-16 PROCEDURE — 1036F TOBACCO NON-USER: CPT | Performed by: GENERAL PRACTICE

## 2024-10-16 NOTE — PROGRESS NOTES
Otolaryngology - Head and Neck Surgery Outpatient New Patient Visit Note        Assessment/Plan:   Problem List Items Addressed This Visit    None  Visit Diagnoses         Codes    Chronic tonsillitis    -  Primary J35.01    Deviated septum     J34.2            44yoF with chronic tonsillitis.  Brief relief with antibiotic/steroid but with ongoing chronic mild odynophagia.     Some chornic nasal obstruction with deviated septum  Improvement in nasal symptoms with use of nasacort.     Discussed observation, possible tonsillectomy, possible nasal procedure.   Pt will observe and RTC if symptoms persist to discuss options.       Follow up:  -plan for follow up in clinic as needed    All of the above findings, impressions, treatment planning and follow up plans were discussed with the patient who indicated understanding.  the patient was instructed to contact or return to clinic sooner if symptoms/signs persist or worsen despite the above management.      Chong Quach MD  Otolaryngology - Head and Neck Surgery            History Of Present Illness  Maureen Thapa is a 44 y.o. female presenting for follow up of treated CT sinus.   Reports improvement in nasal symptoms with use of nasacort.   Reports good effect of PO steroids to alleviate tonsil symtoms, but symptoms returned after completion of medications.    Now with essentially baseline mild sore throat and odynophagia.     Recall    Maureen Thapa is a 44 y.o. female presenting for evaluation fo a history of recurrent sinusitis and chronic rhinitis.     Reports chronic congestion, some waxing/waning max/midface pain/pressure and bothersome PND.      Notes recurrent sinusitis 2-3x yearly, requiring abx or OTC care.      Reports allergic rhinitis, uses allegra PRN. Prior use of flonase over months which caused increase in symptoms and irritation.   Saline sprays intermittently.      Reports R>L sinus pressure/discomfort.      Notes some chronic sore throat associated  with PND, and tonsil stones.      Rare GERD history.      Reports waxing/waning R>L ear fullness and pressure in recent months, worse with URIs.       Reports some dizziness/vertigo associated with recent URI which resolved over several days, now asymptomatic.    Rare prior vertigo/dizziness.             Past Medical History  She has a past medical history of Personal history of other complications of pregnancy, childbirth and the puerperium, Personal history of other diseases of the circulatory system, Personal history of other diseases of the circulatory system, Personal history of other diseases of the female genital tract, and Varicella without complication.    Surgical History  She has a past surgical history that includes Varicose vein surgery (06/10/2016) and Mouth surgery (12/18/2014).     Social History  She reports that she quit smoking about 44 years ago. Her smoking use included cigarettes. She has never used smokeless tobacco. She reports that she does not currently use alcohol after a past usage of about 1.0 standard drink of alcohol per week. She reports that she does not use drugs.    Family History  Family History   Problem Relation Name Age of Onset    Blood clot Mother      Diabetes Father      Other (HTN) Father      Hyperlipidemia Father      Cancer Mother's Sister          Allergies  Iodinated contrast media and Penicillin g    Review of Systems  ROS: Pertinent positives as noted in HPI.    - CONSTITUTIONAL: Does not report weight loss, fever or chills.    - HEENT:   Ear: Does not report tinnitus, vertigo, hearing loss, otalgia, otorrhea  Nose: Does not report  ,  , epistaxis, decreased smell  Throat: Does not report  , dysphagia,    Larynx: Does not report hoarseness,  difficulty breathing, pain with speaking (odynophonia)  Neck: Does not report new masses, pain, swelling  Face: Does not report sinus pain, pressure, swelling, numbness, weakness     - RESPIRATORY: Does not report SOB or  cough.    - CV: Does not report palpitations or chest pain.     - GI: Does not report abdominal pain, nausea, vomiting or diarrhea.    - : Does not report dysuria or urinary frequency.    - MSK: Does not report myalgia or joint pain.    - SKIN: Does not report rash or pruritus.    - NEUROLOGICAL: Does not report headache or syncope.    - PSYCHIATRIC: Does not report recent changes in mood. Does not report anxiety or depression.         Physical Exam:     GENERAL:   Alert & Oriented to person, place and time; Normal affect and appearance. Well developed and well nourished. Conversant & cooperative with examination.     HEAD:   Normocephalic, atraumatic. No sinus tenderness to palpation. Normal parotid bilaterally. Normal facial strength.     NEUROLOGIC:   Cranial nerves II-XII grossly intact, gait WNL. Normal mood and affect.    EYES:   Extraocular movements intact. Pupils equal, round, reactive to light and accommodation. No nystagmus, no ptosis. no scleral injection.    EAR:   Normal auricle. No discomfort or TTP with manipulation.   Handheld otoscopic exam showed normal external auditory canals bilaterally. No purulence or EAC inflammation. Minimal cerumen.   Right tympanic membrane clear and mobile without evidence of perforation, retraction or middle ear effusion.   Left tympanic membrane clear and mobile without evidence of perforation, retraction or middle ear effusion.     NOSE:   No external deformity. No external nasal lesions, lacerations, or scars. Nasal tip symmetrical with normal nasal valves.   Nasal cavity with rightward septum, normal mucosa and turbinates. No lesions, masses, purulence or polyps.     OC/OP:   Mucous membranes moist, no masses, lesions or exudates.   Normal tongue, floor of mouth, teeth, gums, lips. Normal posterior pharyngeal wall.    Cryptic 1+  tonsils without erythema, exudate or obvious calculi     NECK:   No neck masses or thyroid enlargement. Trachea midline. No tenderness  to palpation    LYMPHATIC:   No cervical lymphadenopathy.     RESPIRATORY:   Symmetric chest elevation & no retractions. No significant hoarseness. No increased work of breathing.    CV:   No clubbing or cyanosis. No obvious edema    Skin:   No facial rashes, vesicles or lesions.     Extremities:   No gross abnormalities      Clinic Procedure        Information review:  External sources (notes, imaging, lab results) listed below personally reviewed to aid in medical decision making process.  -CT sinus 9/20/24  -  -

## 2024-11-21 ENCOUNTER — APPOINTMENT (OUTPATIENT)
Dept: VASCULAR SURGERY | Facility: CLINIC | Age: 44
End: 2024-11-21
Payer: COMMERCIAL

## 2024-12-04 PROBLEM — K90.0 ADULT FORM OF CELIAC DISEASE (HHS-HCC): Status: ACTIVE | Noted: 2024-12-04

## 2024-12-04 PROBLEM — A04.8 HELICOBACTER PYLORI INFECTION: Status: ACTIVE | Noted: 2024-12-04

## 2024-12-04 PROBLEM — Z86.79 HISTORY OF VASCULITIS: Status: ACTIVE | Noted: 2024-12-04

## 2024-12-04 PROBLEM — E78.2 MIXED HYPERLIPIDEMIA: Status: ACTIVE | Noted: 2024-12-04

## 2024-12-04 PROBLEM — M51.9 DISORDER OF INTERVERTEBRAL DISC OF LUMBAR SPINE: Status: ACTIVE | Noted: 2024-12-04

## 2024-12-04 PROBLEM — K64.9 HEMORRHOIDS: Status: ACTIVE | Noted: 2024-12-04

## 2024-12-04 PROBLEM — E34.9 DISORDER OF ENDOCRINE SYSTEM: Status: ACTIVE | Noted: 2024-12-04

## 2024-12-04 PROBLEM — Z97.5 PRESENCE OF INTRAUTERINE CONTRACEPTIVE DEVICE: Status: ACTIVE | Noted: 2024-12-04

## 2024-12-04 PROBLEM — B01.9 VARICELLA: Status: ACTIVE | Noted: 2024-12-04

## 2024-12-04 RX ORDER — ACYCLOVIR 400 MG/1
TABLET ORAL
COMMUNITY
Start: 2024-09-25

## 2024-12-05 ENCOUNTER — OFFICE VISIT (OUTPATIENT)
Dept: VASCULAR SURGERY | Facility: CLINIC | Age: 44
End: 2024-12-05
Payer: COMMERCIAL

## 2024-12-05 VITALS
HEART RATE: 90 BPM | SYSTOLIC BLOOD PRESSURE: 96 MMHG | HEIGHT: 67 IN | OXYGEN SATURATION: 99 % | WEIGHT: 154 LBS | DIASTOLIC BLOOD PRESSURE: 67 MMHG | BODY MASS INDEX: 24.17 KG/M2

## 2024-12-05 DIAGNOSIS — I83.813 VARICOSE VEINS OF BILATERAL LOWER EXTREMITIES WITH PAIN: ICD-10-CM

## 2024-12-05 PROCEDURE — 99214 OFFICE O/P EST MOD 30 MIN: CPT | Performed by: NURSE PRACTITIONER

## 2024-12-05 PROCEDURE — 1036F TOBACCO NON-USER: CPT | Performed by: NURSE PRACTITIONER

## 2024-12-05 PROCEDURE — 3008F BODY MASS INDEX DOCD: CPT | Performed by: NURSE PRACTITIONER

## 2024-12-05 PROCEDURE — 99204 OFFICE O/P NEW MOD 45 MIN: CPT | Performed by: NURSE PRACTITIONER

## 2024-12-05 ASSESSMENT — COLUMBIA-SUICIDE SEVERITY RATING SCALE - C-SSRS
6. HAVE YOU EVER DONE ANYTHING, STARTED TO DO ANYTHING, OR PREPARED TO DO ANYTHING TO END YOUR LIFE?: NO
1. IN THE PAST MONTH, HAVE YOU WISHED YOU WERE DEAD OR WISHED YOU COULD GO TO SLEEP AND NOT WAKE UP?: NO
2. HAVE YOU ACTUALLY HAD ANY THOUGHTS OF KILLING YOURSELF?: NO

## 2024-12-05 ASSESSMENT — PATIENT HEALTH QUESTIONNAIRE - PHQ9
1. LITTLE INTEREST OR PLEASURE IN DOING THINGS: NOT AT ALL
2. FEELING DOWN, DEPRESSED OR HOPELESS: NOT AT ALL
SUM OF ALL RESPONSES TO PHQ9 QUESTIONS 1 AND 2: 0

## 2024-12-05 ASSESSMENT — ENCOUNTER SYMPTOMS
DEPRESSION: 0
ALLERGIC/IMMUNOLOGIC NEGATIVE: 1
NEUROLOGICAL NEGATIVE: 1
EYES NEGATIVE: 1
LOSS OF SENSATION IN FEET: 0
PSYCHIATRIC NEGATIVE: 1
ROS SKIN COMMENTS: BLE VARICOSE VEINS
PALPITATIONS: 0
SHORTNESS OF BREATH: 0
ENDOCRINE NEGATIVE: 1
CONSTITUTIONAL NEGATIVE: 1
GASTROINTESTINAL NEGATIVE: 1
RESPIRATORY NEGATIVE: 1
OCCASIONAL FEELINGS OF UNSTEADINESS: 0
MUSCULOSKELETAL NEGATIVE: 1

## 2024-12-05 ASSESSMENT — PAIN SCALES - GENERAL: PAINLEVEL_OUTOF10: 0-NO PAIN

## 2024-12-05 NOTE — PROGRESS NOTES
NPV REASON: symptomatic varicose veins    CURRENT ENCOUNTER:  Maureen Thapa is 44 y.o. female here for left ankle spider veins feel like crawling. Bilateral thigh edema at times, with salty food and with previous pregnancies along with standing for long periods of time. Since the previous division and ligation to her left leg she doesn't have as much thigh swelling. She notes most of the swelling in her legs now is in the right thigh. Notes the compression stocking mostly helps her right leg pain. She has been wearing compression stocking since prior to 2016. First started noting the varicose veins when she was 23-25 years old, worse after her pregnancies.     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 Left leg sclerotherapy in 2016  Previous phlebitis/DVT/PE: possible phlebitis in the past  Previous venous ulcers: No  Family hx of varicose veins: yes mother and father  Using medical grade compression stockings: yes  Previous varicose veins bleeding:  yes    RIGHT LEG C1 Telangiectasias or reticular veins and C2 Varicose Veins  RIGHT LEG PAIN 2, VARICOSE VEINS 3, VENOUS EDEMA 3, and USE OF COMPRESSION 3  RIGHT LEG CEAP: C2  RIGHT LEG VCSS SCORE: 11    LEFT LEG C1 Telangiectasias or reticular veins and C2 Varicose Veins  LEFT LEG PAIN 1, VARICOSE VEINS 2, and USE OF COMPRESSION 3  LEFT LEG CEAP: C2  LEFT LEG VCSS SCORE: 6    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    PastMedHX:  Past Medical History:   Diagnosis Date    Personal history of other complications of pregnancy, childbirth and the puerperium     History of spontaneous     Personal history of other diseases of the circulatory system     History of vasculitis    Personal history of other diseases of the circulatory system     History of mitral valve prolapse    Personal history of other diseases of the female genital tract     Vaginal delivery    Varicella without  complication     Varicella without complication     Past Surgical History:   Procedure Laterality Date    MOUTH SURGERY  2014    Oral Surgery Tooth Extraction    VARICOSE VEIN SURGERY  06/10/2016    Varicose Vein Ligation     Family History   Problem Relation Name Age of Onset    Blood clot Mother      Diabetes Father      Other (HTN) Father      Hyperlipidemia Father      Cancer Mother's Sister       Social History     Socioeconomic History    Marital status:      Spouse name: Not on file    Number of children: Not on file    Years of education: Not on file    Highest education level: Not on file   Occupational History    Not on file   Tobacco Use    Smoking status: Former     Current packs/day: 0.00     Types: Cigarettes     Quit date:      Years since quittin.9    Smokeless tobacco: Never   Vaping Use    Vaping status: Never Used   Substance and Sexual Activity    Alcohol use: Not Currently     Alcohol/week: 1.0 standard drink of alcohol     Types: 1 Cans of beer per week    Drug use: Never    Sexual activity: Yes   Other Topics Concern    Not on file   Social History Narrative    Not on file     Social Drivers of Health     Financial Resource Strain: Not on file   Food Insecurity: Not on file   Transportation Needs: Not on file   Physical Activity: Not on file   Stress: Not on file   Social Connections: Not on file   Intimate Partner Violence: Not on file   Housing Stability: Not on file     Meds:     Current Outpatient Medications:     acyclovir (Zovirax) 400 mg tablet, , Disp: , Rfl:     multivit with min-folic acid 0.4 mg tablet, Take by mouth., Disp: , Rfl:     triamcinolone (Nasacort) 55 mcg nasal inhaler, Administer 2 sprays into each nostril once daily., Disp: 16.5 g, Rfl: 3    Allergies:   Allergies   Allergen Reactions    Iodinated Contrast Media Other     Blood clots    Penicillin G Rash     ROS:  Review of Systems   Constitutional: Negative.    HENT: Negative.     Eyes: Negative.  "   Respiratory: Negative.  Negative for shortness of breath.    Cardiovascular:  Positive for leg swelling. Negative for chest pain and palpitations.   Gastrointestinal: Negative.    Endocrine: Negative.    Genitourinary: Negative.    Musculoskeletal: Negative.    Skin:         BLE varicose veins   Allergic/Immunologic: Negative.    Neurological: Negative.    Psychiatric/Behavioral: Negative.        Objective:  Vitals:  Vitals:    12/05/24 1023   BP: 96/67   Pulse:    SpO2:     BP 96/67 (BP Location: Right arm, Patient Position: Sitting, BP Cuff Size: Large adult)   Pulse 90   Ht 1.702 m (5' 7\")   Wt 69.9 kg (154 lb)   SpO2 99%   BMI 24.12 kg/m²     Exam:  Physical Exam  Vitals reviewed.   Constitutional:       Appearance: Normal appearance.   HENT:      Head: Normocephalic and atraumatic.      Nose: Nose normal.      Mouth/Throat:      Mouth: Mucous membranes are moist.   Eyes:      Conjunctiva/sclera: Conjunctivae normal.   Cardiovascular:      Rate and Rhythm: Normal rate.      Pulses: Normal pulses.   Pulmonary:      Effort: Pulmonary effort is normal.   Musculoskeletal:         General: Normal range of motion.      Cervical back: Normal range of motion.   Skin:     General: Skin is warm and dry.      Capillary Refill: Capillary refill takes less than 2 seconds.      Comments: Varicose veins to right medial thigh down to calf. Scattered reticular veins   Neurological:      General: No focal deficit present.      Mental Status: She is alert and oriented to person, place, and time.   Psychiatric:         Mood and Affect: Mood normal.         Behavior: Behavior normal.         Thought Content: Thought content normal.         Judgment: Judgment normal.         Labs:  No results found for: \"WBC\", \"HGB\", \"HCT\", \"MCV\", \"PLT\"  No results found for: \"CREATININE\", \"BUN\", \"NA\", \"K\", \"CL\", \"CO2\"      Imaging:                    Assessment & Plan:  Maureen Thapa is 44 y.o. female with a history of  ELVT of the left " great saphenous vein with divisions and ligation at the [facility] in 2016, Left leg sclerotherapy in 2016, and  phlebitis who is presents with left ankle spider veins feel like crawling. Bilateral thigh edema at times, with salty food and with previous pregnancies along with standing for long periods of time. Since the previous division and ligation to her left leg she doesn't have as much thigh swelling. She notes most of the swelling in her legs now is in the right thigh. Notes the compression stocking mostly helps her right leg pain. She has been wearing compression stocking since prior to 2016. First started noting the varicose veins when she was 23-25 years old, worse after her pregnancies.     12/5/24  RIGHT LEG CEAP: C2  RIGHT LEG VCSS SCORE: 11  LEFT LEG CEAP: C2  LEFT LEG VCSS SCORE: 6    It was a pleasure taking care of you today and appreciate your seeing us at our  [facility].     Today's plan is as follows:  1) Elevate your legs at rest  2) Wear 20-30mmHg compression stockings, on during the day when standing, off while sleeping  3) Complete a venous insufficiency ultrasound and follow up with me after. This appointment may be a virtual telephone visit to discuss your plan.    Jannet To DNP, APRN-CNP, AGPCNP-C, FNP-C, AGACNP-BC  Senior Nurse Practitioner, Vascular Surgery  [facility], [facility] [place]  [address], Office [phone]

## 2024-12-05 NOTE — PATIENT INSTRUCTIONS
It was a pleasure taking care of you today and appreciate your seeing us at our [facility] [place] [facility].     Today's plan is as follows:  1) Elevate your legs at rest  2) Wear 20-30mmHg compression stockings, on during the day when standing, off while sleeping  3) Complete a venous insufficiency ultrasound and follow up with me after. This appointment may be a virtual telephone visit to discuss your plan.    Please call the office with any questions at [phone].   You can speak to our secretaries or our clinical nurses for specific questions.   For Vein Center specific questions, you can also call [phone] or email at [email or web address]  If you need coordinating your appointments and testing you can do these at the  or by calling my office shortly after your visit.

## 2024-12-16 ENCOUNTER — APPOINTMENT (OUTPATIENT)
Dept: PRIMARY CARE | Facility: CLINIC | Age: 44
End: 2024-12-16
Payer: COMMERCIAL

## 2024-12-17 ENCOUNTER — OFFICE VISIT (OUTPATIENT)
Dept: PRIMARY CARE | Facility: CLINIC | Age: 44
End: 2024-12-17
Payer: COMMERCIAL

## 2024-12-17 VITALS
TEMPERATURE: 98.4 F | OXYGEN SATURATION: 97 % | BODY MASS INDEX: 24.2 KG/M2 | SYSTOLIC BLOOD PRESSURE: 101 MMHG | DIASTOLIC BLOOD PRESSURE: 68 MMHG | HEART RATE: 72 BPM | WEIGHT: 154.5 LBS

## 2024-12-17 DIAGNOSIS — J32.9 CHRONIC SINUSITIS WITH RECURRENT BRONCHITIS: Primary | ICD-10-CM

## 2024-12-17 DIAGNOSIS — J40 CHRONIC SINUSITIS WITH RECURRENT BRONCHITIS: Primary | ICD-10-CM

## 2024-12-17 PROCEDURE — 99213 OFFICE O/P EST LOW 20 MIN: CPT | Performed by: INTERNAL MEDICINE

## 2024-12-17 PROCEDURE — 1036F TOBACCO NON-USER: CPT | Performed by: INTERNAL MEDICINE

## 2024-12-17 RX ORDER — AMOXICILLIN AND CLAVULANATE POTASSIUM 500; 125 MG/1; MG/1
500 TABLET, FILM COATED ORAL 3 TIMES DAILY
Qty: 30 TABLET | Refills: 0 | Status: SHIPPED | OUTPATIENT
Start: 2024-12-17

## 2024-12-17 RX ORDER — BENZONATATE 200 MG/1
200 CAPSULE ORAL 3 TIMES DAILY PRN
Qty: 30 CAPSULE | Refills: 0 | Status: SHIPPED | OUTPATIENT
Start: 2024-12-17 | End: 2025-01-16

## 2024-12-17 ASSESSMENT — ENCOUNTER SYMPTOMS
LOSS OF SENSATION IN FEET: 0
OCCASIONAL FEELINGS OF UNSTEADINESS: 0
DEPRESSION: 0

## 2024-12-17 NOTE — PROGRESS NOTES
Subjective   Patient ID: Maureen Thapa is a 44 y.o. female who presents for Cough (X2 .5 weeks).    HPI   44 years old female comes in to see me complaining of 2 weeks old cough.  It became worse than earlier and now it is producing yellow sputum.  She felt little sore throat without any runny nose.  Her cough is much worse at night even though she is sleeping with her head up.  No fever no chills.  He feels short of breath in the morning.  Her pulse ox is 97%.  She took an antihistamine thinking that might help her congestion and it made her worse.  Pharmacy is [facility].  Cannot take azithromycin that made her sick.  Able to tolerate penicillin or Augmentin amoxicillin.  Review of Systems  12 system reviewed 12 system negative.  Seen by ENT not too long ago and also seen by endocrinologist clear for her thyroid condition ultrasound done blood test for TSH done all within normal limit.  Exposed to radiation when she was in her 20s at [employer]  Objective   /68 (BP Location: Left arm, Patient Position: Sitting, BP Cuff Size: Adult)   Pulse 72   Temp 36.9 °C (98.4 °F) (Temporal)   Wt 70.1 kg (154 lb 8 oz)   SpO2 97%   BMI 24.20 kg/m²     Physical Exam  Alert oriented no distress.  Nonicteric sclera or jaundice.  Face symmetrical cranial nerves intact.  Neck supple no masses lymph nodes thyromegaly or jugular venous distention.  No carotid bruits.  Lungs diminished but clear no rales or wheezing.  Heart normal S1 and S2 regular rhythm.  Abdomen benign nontender no masses no organomegaly.  Neurologically no deficit or weakness in the upper and lower extremities.  No sensory deficit.  Normal speech.  Throat is slightly inflamed and red.  Sinuses are also inflamed and red.  Skin reveals no lesions.  We will treat you with Augmentin twice a day with food and benzonatate for your cough.  If you are not better by few days or before the weekend please let me know.  Assessment/Plan   Diagnoses and all orders for  this visit:  Chronic sinusitis with recurrent bronchitis  -     amoxicillin-pot clavulanate (Augmentin) 500-125 mg tablet; Take 1 tablet (500 mg) by mouth 3 times a day.  -     benzonatate (Tessalon) 200 mg capsule; Take 1 capsule (200 mg) by mouth 3 times a day as needed for cough. Do not crush or chew.

## 2024-12-24 ENCOUNTER — HOSPITAL ENCOUNTER (OUTPATIENT)
Dept: VASCULAR MEDICINE | Facility: CLINIC | Age: 44
Discharge: HOME | End: 2024-12-24
Payer: COMMERCIAL

## 2024-12-24 DIAGNOSIS — I83.893 VARICOSE VEINS OF BILATERAL LOWER EXTREMITIES WITH OTHER COMPLICATIONS: ICD-10-CM

## 2024-12-24 DIAGNOSIS — I83.813 VARICOSE VEINS OF BILATERAL LOWER EXTREMITIES WITH PAIN: ICD-10-CM

## 2024-12-24 PROCEDURE — 93970 EXTREMITY STUDY: CPT | Performed by: SURGERY

## 2024-12-24 PROCEDURE — 93970 EXTREMITY STUDY: CPT

## 2025-01-16 ENCOUNTER — OFFICE VISIT (OUTPATIENT)
Dept: VASCULAR SURGERY | Facility: CLINIC | Age: 45
End: 2025-01-16
Payer: COMMERCIAL

## 2025-01-16 VITALS
OXYGEN SATURATION: 96 % | BODY MASS INDEX: 24.48 KG/M2 | WEIGHT: 156 LBS | HEIGHT: 67 IN | DIASTOLIC BLOOD PRESSURE: 71 MMHG | HEART RATE: 79 BPM | SYSTOLIC BLOOD PRESSURE: 118 MMHG

## 2025-01-16 DIAGNOSIS — I83.813 VARICOSE VEINS OF BILATERAL LOWER EXTREMITIES WITH PAIN: Primary | ICD-10-CM

## 2025-01-16 PROCEDURE — 99214 OFFICE O/P EST MOD 30 MIN: CPT | Performed by: NURSE PRACTITIONER

## 2025-01-16 PROCEDURE — 3008F BODY MASS INDEX DOCD: CPT | Performed by: NURSE PRACTITIONER

## 2025-01-16 PROCEDURE — 1036F TOBACCO NON-USER: CPT | Performed by: NURSE PRACTITIONER

## 2025-01-16 ASSESSMENT — ENCOUNTER SYMPTOMS
OCCASIONAL FEELINGS OF UNSTEADINESS: 0
DEPRESSION: 0
RESPIRATORY NEGATIVE: 1
SHORTNESS OF BREATH: 0
LOSS OF SENSATION IN FEET: 0
PALPITATIONS: 0
ENDOCRINE NEGATIVE: 1
ALLERGIC/IMMUNOLOGIC NEGATIVE: 1
MUSCULOSKELETAL NEGATIVE: 1
PSYCHIATRIC NEGATIVE: 1
EYES NEGATIVE: 1
ROS SKIN COMMENTS: BLE VARICOSE VEINS
NEUROLOGICAL NEGATIVE: 1
CONSTITUTIONAL NEGATIVE: 1

## 2025-01-16 ASSESSMENT — COLUMBIA-SUICIDE SEVERITY RATING SCALE - C-SSRS
2. HAVE YOU ACTUALLY HAD ANY THOUGHTS OF KILLING YOURSELF?: NO
1. IN THE PAST MONTH, HAVE YOU WISHED YOU WERE DEAD OR WISHED YOU COULD GO TO SLEEP AND NOT WAKE UP?: NO
6. HAVE YOU EVER DONE ANYTHING, STARTED TO DO ANYTHING, OR PREPARED TO DO ANYTHING TO END YOUR LIFE?: NO

## 2025-01-16 ASSESSMENT — PAIN SCALES - GENERAL: PAINLEVEL_OUTOF10: 0-NO PAIN

## 2025-01-16 NOTE — PROGRESS NOTES
F/U REASON: varicose veins    CURRENT ENCOUNTER:  Maureen Thapa is 44 y.o. female here for follow up of symptomatic painful recurrent varicose veins.    left ankle spider veins feel like crawling. Bilateral thigh edema at times, with salty food and with previous pregnancies along with standing for long periods of time. Since the previous division and ligation to her left leg she doesn't have as much thigh swelling. She notes most of the swelling in her legs now is in the right thigh. Notes the compression stocking mostly helps her right leg pain. She has been wearing compression stocking since prior to 2016. First started noting the varicose veins when she was 23-25 years old, worse after her pregnancies.      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 Left leg sclerotherapy in 2016  Previous phlebitis/DVT/PE: possible phlebitis in the past  Previous venous ulcers: No  Family hx of varicose veins: yes mother and father  Using medical grade compression stockings: yes  Previous varicose veins bleeding:  yes    Meds:     Current Outpatient Medications:     acyclovir (Zovirax) 400 mg tablet, , Disp: , Rfl:     benzonatate (Tessalon) 200 mg capsule, Take 1 capsule (200 mg) by mouth 3 times a day as needed for cough. Do not crush or chew., Disp: 30 capsule, Rfl: 0    multivit with min-folic acid 0.4 mg tablet, Take by mouth., Disp: , Rfl:     triamcinolone (Nasacort) 55 mcg nasal inhaler, Administer 2 sprays into each nostril once daily., Disp: 16.5 g, Rfl: 3    amoxicillin-pot clavulanate (Augmentin) 500-125 mg tablet, Take 1 tablet (500 mg) by mouth 3 times a day., Disp: 30 tablet, Rfl: 0    Allergies:   Allergies   Allergen Reactions    Iodinated Contrast Media Other     Blood clots    Penicillin G Rash       ROS:  Review of Systems   Constitutional: Negative.    HENT: Negative.     Eyes: Negative.    Respiratory: Negative.   "Negative for shortness of breath.    Cardiovascular:  Negative for chest pain and palpitations.   Endocrine: Negative.    Genitourinary: Negative.    Musculoskeletal: Negative.    Skin:         BLE varicose veins   Allergic/Immunologic: Negative.    Neurological: Negative.    Psychiatric/Behavioral: Negative.       otherwise unremarkable    Objective:  Vitals:  Vitals:    01/16/25 0920   BP: 118/71   Pulse: 79   SpO2: 96%      /71 (BP Location: Left arm, Patient Position: Sitting, BP Cuff Size: Adult)   Pulse 79   Ht 1.702 m (5' 7\")   Wt 70.8 kg (156 lb)   SpO2 96%   BMI 24.43 kg/m²     Physical Exam  Vitals reviewed.   Constitutional:       Appearance: Normal appearance.   HENT:      Head: Normocephalic and atraumatic.      Nose: Nose normal.      Mouth/Throat:      Mouth: Mucous membranes are moist.   Eyes:      Conjunctiva/sclera: Conjunctivae normal.   Cardiovascular:      Rate and Rhythm: Normal rate.      Pulses: Normal pulses.   Pulmonary:      Effort: Pulmonary effort is normal.   Musculoskeletal:         General: Normal range of motion.      Cervical back: Normal range of motion.   Skin:     General: Skin is warm and dry.      Capillary Refill: Capillary refill takes less than 2 seconds.      Comments: Varicose veins to right medial thigh down to calf. Scattered reticular veins. Varicose vein to left medial calf   Neurological:      General: No focal deficit present.      Mental Status: She is alert and oriented to person, place, and time.         Labs:  No results found for: \"WBC\", \"HGB\", \"HCT\", \"MCV\", \"PLT\"  No results found for: \"CREATININE\", \"BUN\", \"NA\", \"K\", \"CL\", \"CO2\"      Imaging:                                Assessment & Plan:  Maureen Thapa is 44 y.o. female with history of ELVT of the left great saphenous vein with divisions and ligation at the [facility] in 2016, Left leg sclerotherapy in 2016, and  phlebitis who is presents with left ankle spider veins feel like crawling. " Bilateral thigh edema at times, with salty food and with previous pregnancies along with standing for long periods of time. Since the previous division and ligation to her left leg she doesn't have as much thigh swelling. She notes most of the swelling in her legs now is in the right thigh. Notes the compression stocking mostly helps her right leg pain. She has been wearing compression stocking since prior to 2016. First started noting the varicose veins when she was 23-25 years old, worse after her pregnancies.      24  RIGHT LEG CEAP: C2  RIGHT LEG VCSS SCORE: 11  LEFT LEG CEAP: C2  LEFT LEG VCSS SCORE: 6    Today we reviewed the patients VI ultrasound and plan of care.   It was a pleasure taking care of you today and appreciate your seeing us at our [facility] and [facility] [place] [facility]    Based on your leg symptoms, venous insufficiency studies and potential for clinical improvement, I am recommeding the followin) Varithena sclerotherapy vs Sclerotherapy over 2 session, will start with the left leg then treat the right leg  2) Please bring your compression socks to the day of the procedure.   3) We will proceed with insurance approval and call you once we can proceed with scheduling your procedures.    Patient imaging and plan of care discussed with Dr. Ludy To, DNP, APRN-CNP, AGPCNP-C, FNP-C, AGACNP-BC  Senior Nurse Practitioner, Vascular Surgery  [facility], [facility] [place]  [address], Office [phone]

## 2025-01-16 NOTE — PATIENT INSTRUCTIONS
It was a pleasure taking care of you today and appreciate your seeing us at our [facility] and [facility] [place] [facility]    Based on your leg symptoms, venous insufficiency studies and potential for clinical improvement, I am recommeding the followin) Varithena sclerotherapy vs Sclerotherapy over 2 session, will start with the left leg  2) Please bring your compression socks to the day of the procedure.   3) We will proceed with insurance approval and call you once we can proceed with scheduling your procedures.    Please call the office with any questions at [phone].   For Vein Center specific questions, particularly insurance related questions of booking your [place] intervention, you can call [phone] or email at [email or web address]    You can speak directly to my Vein Center Nurse Coordinator, Hetal Rasmussen, for specific questions.

## 2025-02-03 ENCOUNTER — HOSPITAL ENCOUNTER (OUTPATIENT)
Dept: RADIOLOGY | Facility: CLINIC | Age: 45
Discharge: HOME | End: 2025-02-03
Payer: COMMERCIAL

## 2025-02-03 DIAGNOSIS — Z12.31 ENCOUNTER FOR SCREENING MAMMOGRAM FOR BREAST CANCER: ICD-10-CM

## 2025-02-03 PROCEDURE — 77063 BREAST TOMOSYNTHESIS BI: CPT

## 2025-02-03 PROCEDURE — 77063 BREAST TOMOSYNTHESIS BI: CPT | Performed by: RADIOLOGY

## 2025-02-03 PROCEDURE — 77067 SCR MAMMO BI INCL CAD: CPT | Performed by: RADIOLOGY

## 2025-02-25 ENCOUNTER — TELEPHONE (OUTPATIENT)
Dept: OBSTETRICS AND GYNECOLOGY | Facility: CLINIC | Age: 45
End: 2025-02-25
Payer: COMMERCIAL

## 2025-02-26 NOTE — TELEPHONE ENCOUNTER
Patient called. It does not look like she had hormones checked. She is having periods still every 25-30 days. I discussed the pros and cons of Mirena vs. Paragard, especially as it relates to the perimenopause. Some of her periods are heavy and some light. The Mirena would help with that. But we discussed the irregular bleeding that she would have. She asked about side effects like moods and acne. I said she would have to see how she does but give it 3 months. If she doesn't like the Mirena we can go back. She would like to try the Mirena. Will schedule with her annual.

## 2025-04-10 ENCOUNTER — APPOINTMENT (OUTPATIENT)
Dept: OBSTETRICS AND GYNECOLOGY | Facility: CLINIC | Age: 45
End: 2025-04-10
Payer: COMMERCIAL

## 2025-04-10 VITALS — BODY MASS INDEX: 24.12 KG/M2 | WEIGHT: 154 LBS | SYSTOLIC BLOOD PRESSURE: 114 MMHG | DIASTOLIC BLOOD PRESSURE: 66 MMHG

## 2025-04-10 DIAGNOSIS — Z32.02 PREGNANCY TEST NEGATIVE: ICD-10-CM

## 2025-04-10 DIAGNOSIS — Z30.432 ENCOUNTER FOR IUD REMOVAL: Primary | ICD-10-CM

## 2025-04-10 DIAGNOSIS — Z30.430 ENCOUNTER FOR INSERTION OF MIRENA IUD: ICD-10-CM

## 2025-04-10 LAB — PREGNANCY TEST URINE, POC: NEGATIVE

## 2025-04-21 ENCOUNTER — APPOINTMENT (OUTPATIENT)
Dept: OBSTETRICS AND GYNECOLOGY | Facility: CLINIC | Age: 45
End: 2025-04-21
Payer: COMMERCIAL

## 2025-05-12 NOTE — PROGRESS NOTES
Subjective   Patient ID: Maureen Thapa is a 45 y.o. female who presents for Annual Exam.    PAP 2-26-24 NEG NEG; 4-19-21 ASCUS, HPV NEG  MAMMO 2-3-25  DEXA NEVER  COLON 5-1-23 good for 10 years.     Patient just had Paragard IUD removed and Mirena inserted on 4/10/25. Did well. Spotting only.     Mother passed of Covid, was diagnosed with a small bowel condition that is genetic, spine bends and puts weight on bowel. If she keeps a good diet and doesn't gain weight it shouldn't be an issue.     Not working. Kids in school. 16, 13, 1-. Taking MVI with D and EPA. Daughter diagnosed with autoimmune disease, genetic. It is CAH.     H/O overactive thyroid, ok couple years ago. Was seeing Dermatologist for acne. Male hormones elevated. Stopped spironolactone.      Recommend vit D. in MVI. Valtrex for cold sore.     No longer stomach issues.      Review of Systems   All other systems reviewed and are negative.      Objective   Constitutional: Alert and in no acute distress. Well developed, well nourished.  Neck: no neck asymmetry. Supple and thyroid not enlarged and there were no palpable thyroid nodules.  Chest: Breasts normal appearance, no nipple discharge and no skin changes and palpation of breasts and axillae: no palpable mass and no axillary lymphadenopathy.  Abdomen: soft nontender; no abdominal mass palpated, no organomegaly and no hernias.  Genitourinary: external genitalia: normal, no inguinal lymphadenopathy, Bartholin's urethral and [place]'s glands: normal, urethra: normal and bladder: normal on palpation.  Vagina: normal.  Cervix: normal. Strings seen.  Uterus: normal.   Right adnexa/parametria: normal.  Left adnexa/parametria: normal.  Skin: normal skin color and pigmentation, normal skin turgor and no rash.  Psychiatric: alert and oriented x 3, affect normal to patient baseline and mood appropriate.     Assessment/Plan   -no pap  -Fariha-terrell  -continue with Mirena.

## 2025-05-13 ENCOUNTER — APPOINTMENT (OUTPATIENT)
Dept: OBSTETRICS AND GYNECOLOGY | Facility: CLINIC | Age: 45
End: 2025-05-13
Payer: COMMERCIAL

## 2025-05-13 VITALS
SYSTOLIC BLOOD PRESSURE: 100 MMHG | HEIGHT: 67 IN | DIASTOLIC BLOOD PRESSURE: 60 MMHG | BODY MASS INDEX: 24.45 KG/M2 | WEIGHT: 155.8 LBS

## 2025-05-13 DIAGNOSIS — Z12.31 ENCOUNTER FOR SCREENING MAMMOGRAM FOR BREAST CANCER: Primary | ICD-10-CM

## 2025-05-13 DIAGNOSIS — Z01.419 WELL WOMAN EXAM WITH ROUTINE GYNECOLOGICAL EXAM: ICD-10-CM

## 2025-05-13 PROCEDURE — 3008F BODY MASS INDEX DOCD: CPT | Performed by: OBSTETRICS & GYNECOLOGY

## 2025-05-13 PROCEDURE — 1036F TOBACCO NON-USER: CPT | Performed by: OBSTETRICS & GYNECOLOGY

## 2025-05-13 PROCEDURE — 99396 PREV VISIT EST AGE 40-64: CPT | Performed by: OBSTETRICS & GYNECOLOGY
